# Patient Record
Sex: MALE | Race: WHITE | NOT HISPANIC OR LATINO | URBAN - METROPOLITAN AREA
[De-identification: names, ages, dates, MRNs, and addresses within clinical notes are randomized per-mention and may not be internally consistent; named-entity substitution may affect disease eponyms.]

---

## 2024-08-19 ENCOUNTER — EMERGENCY (EMERGENCY)
Facility: HOSPITAL | Age: 29
LOS: 1 days | Discharge: HOME | End: 2024-08-19
Attending: EMERGENCY MEDICINE
Payer: COMMERCIAL

## 2024-08-19 VITALS
HEIGHT: 70 IN | HEART RATE: 80 BPM | DIASTOLIC BLOOD PRESSURE: 78 MMHG | WEIGHT: 199.96 LBS | TEMPERATURE: 98 F | SYSTOLIC BLOOD PRESSURE: 156 MMHG | OXYGEN SATURATION: 99 % | RESPIRATION RATE: 18 BRPM

## 2024-08-19 PROCEDURE — 99284 EMERGENCY DEPT VISIT MOD MDM: CPT

## 2024-08-19 PROCEDURE — 99283 EMERGENCY DEPT VISIT LOW MDM: CPT

## 2024-08-19 RX ORDER — ACETAMINOPHEN 500 MG
975 TABLET ORAL ONCE
Refills: 0 | Status: COMPLETED | OUTPATIENT
Start: 2024-08-19 | End: 2024-08-19

## 2024-08-19 RX ORDER — IBUPROFEN 200 MG
600 TABLET ORAL ONCE
Refills: 0 | Status: COMPLETED | OUTPATIENT
Start: 2024-08-19 | End: 2024-08-19

## 2024-08-19 RX ORDER — LIDOCAINE 5% 5 G/100G
1 CREAM TOPICAL ONCE
Refills: 0 | Status: COMPLETED | OUTPATIENT
Start: 2024-08-19 | End: 2024-08-19

## 2024-08-19 RX ADMIN — LIDOCAINE 5% 1 PATCH: 5 CREAM TOPICAL at 16:43

## 2024-08-19 RX ADMIN — Medication 975 MILLIGRAM(S): at 16:42

## 2024-08-19 RX ADMIN — Medication 600 MILLIGRAM(S): at 16:55

## 2024-08-19 NOTE — ED PROVIDER NOTE - ATTENDING CONTRIBUTION TO CARE
------------ATTENDING NOTE------------  pt brought to ED by EMS c/o low back pain, describes lifting/bending at work then gradual worsening severe low back pain, in paraspinal areas, no radiation down leg, no trauma, no recent fevers/illness, no incontinence/retention or loss of function, no red flags by Hx / ROS / PE, awaiting symptomatic tx/close reassessment -->  - Jamshid Donato MD   -------------------------------------------------------

## 2024-08-19 NOTE — ED PROVIDER NOTE - PATIENT PORTAL LINK FT
You can access the FollowMyHealth Patient Portal offered by [facility] by registering at the following website: [email or web address]. By joining [employer]s FollowMyHealth portal, you will also be able to view your health information using other applications (apps) compatible with our system.

## 2024-08-19 NOTE — ED PROVIDER NOTE - NSFOLLOWUPINSTRUCTIONS_ED_ALL_ED_FT
See your Primary Doctor this week for follow up -- call to discuss.    Rest, limit strenuous activity until better.    Use Acetaminophen, Ibuprofen, and/or Lidocaine Patch as directed for pain -- see medication warnings.    See BACK STRAIN information and return instructions given to you.    Seek immediate medical care for new/worsening symptoms/concerns.

## 2024-08-19 NOTE — ED ADULT NURSE NOTE - NSFALLRISKINTERV_ED_ALL_ED
Assistance OOB with selected safe patient handling equipment if applicable/Assistance with ambulation/Communicate fall risk and risk factors to all staff, patient, and family/Monitor gait and stability/Provide visual cue: yellow wristband, yellow gown, etc/Reinforce activity limits and safety measures with patient and family/Call bell, personal items and telephone in reach/Instruct patient to call for assistance before getting out of bed/chair/stretcher/Non-slip footwear applied when patient is off stretcher/[place] to call system/Physically safe environment - no spills, clutter or unnecessary equipment/Purposeful Proactive Rounding/Room/bathroom lighting operational, light cord in reach

## 2024-08-19 NOTE — ED PROVIDER NOTE - ADDITIONAL NOTES AND INSTRUCTIONS:
You likely have a lumbar strain. If in a few days you are not feeling better follow up with PCP for another evaluation to see if you are ready to return to work.

## 2024-08-19 NOTE — ED PROVIDER NOTE - MUSCULOSKELETAL MINIMAL EXAM
right lumbar paraspinal tenderness to palpation , ROM limited of right leg secondary to pain , neurovascularly intact/RANGE OF MOTION LIMITED/TENDERNESS

## 2024-08-19 NOTE — ED ADULT NURSE NOTE - OBJECTIVE STATEMENT
28 yr old male was lifting a heavy bucket of water when he heard a pop in the back and was unable to move without pain. no medical issues. on assessment a and o x 3 lungs clear bad soft non tender no swelling in extremities no n/v/d no fevers no other complaints.